# Patient Record
Sex: FEMALE | Race: WHITE | HISPANIC OR LATINO | Employment: FULL TIME | URBAN - METROPOLITAN AREA
[De-identification: names, ages, dates, MRNs, and addresses within clinical notes are randomized per-mention and may not be internally consistent; named-entity substitution may affect disease eponyms.]

---

## 2019-09-26 ENCOUNTER — HOSPITAL ENCOUNTER (EMERGENCY)
Facility: HOSPITAL | Age: 49
Discharge: HOME | End: 2019-09-26
Attending: EMERGENCY MEDICINE | Admitting: EMERGENCY MEDICINE
Payer: COMMERCIAL

## 2019-09-26 ENCOUNTER — APPOINTMENT (EMERGENCY)
Dept: RADIOLOGY | Facility: HOSPITAL | Age: 49
End: 2019-09-26
Payer: COMMERCIAL

## 2019-09-26 VITALS
WEIGHT: 201.13 LBS | BODY MASS INDEX: 33.51 KG/M2 | TEMPERATURE: 98.7 F | DIASTOLIC BLOOD PRESSURE: 60 MMHG | HEART RATE: 88 BPM | SYSTOLIC BLOOD PRESSURE: 100 MMHG | OXYGEN SATURATION: 100 % | RESPIRATION RATE: 16 BRPM | HEIGHT: 65 IN

## 2019-09-26 DIAGNOSIS — J18.9 RLL PNEUMONIA: Primary | ICD-10-CM

## 2019-09-26 DIAGNOSIS — R11.2 NAUSEA AND VOMITING: ICD-10-CM

## 2019-09-26 LAB
ALBUMIN SERPL BCP-MCNC: 3.4 G/DL (ref 3.5–5)
ALP SERPL-CCNC: 55 U/L (ref 46–116)
ALT SERPL W P-5'-P-CCNC: 35 U/L (ref 12–78)
ANION GAP SERPL CALCULATED.3IONS-SCNC: 9 MMOL/L (ref 4–13)
AST SERPL W P-5'-P-CCNC: 37 U/L (ref 5–45)
BACTERIA UR QL AUTO: ABNORMAL /HPF
BASOPHILS # BLD AUTO: 0.01 THOUSANDS/ΜL (ref 0–0.1)
BASOPHILS NFR BLD AUTO: 0 % (ref 0–1)
BILIRUB SERPL-MCNC: 0.4 MG/DL (ref 0.2–1)
BILIRUB UR QL STRIP: ABNORMAL
BUN SERPL-MCNC: 15 MG/DL (ref 5–25)
CALCIUM SERPL-MCNC: 9 MG/DL (ref 8.3–10.1)
CHLORIDE SERPL-SCNC: 104 MMOL/L (ref 100–108)
CLARITY UR: ABNORMAL
CO2 SERPL-SCNC: 27 MMOL/L (ref 21–32)
COLOR UR: ABNORMAL
CREAT SERPL-MCNC: 1.04 MG/DL (ref 0.6–1.3)
EOSINOPHIL # BLD AUTO: 0.02 THOUSAND/ΜL (ref 0–0.61)
EOSINOPHIL NFR BLD AUTO: 0 % (ref 0–6)
ERYTHROCYTE [DISTWIDTH] IN BLOOD BY AUTOMATED COUNT: 13.1 % (ref 11.6–15.1)
EXT PREG TEST URINE: NEGATIVE
EXT. CONTROL ED NAV: NORMAL
GFR SERPL CREATININE-BSD FRML MDRD: 62 ML/MIN/1.73SQ M
GLUCOSE SERPL-MCNC: 103 MG/DL (ref 65–140)
GLUCOSE UR STRIP-MCNC: NEGATIVE MG/DL
HCT VFR BLD AUTO: 40.8 % (ref 34.8–46.1)
HGB BLD-MCNC: 13.6 G/DL (ref 11.5–15.4)
HGB UR QL STRIP.AUTO: NEGATIVE
HYALINE CASTS #/AREA URNS LPF: ABNORMAL /LPF
IMM GRANULOCYTES # BLD AUTO: 0.14 THOUSAND/UL (ref 0–0.2)
IMM GRANULOCYTES NFR BLD AUTO: 1 % (ref 0–2)
KETONES UR STRIP-MCNC: ABNORMAL MG/DL
LEUKOCYTE ESTERASE UR QL STRIP: ABNORMAL
LIPASE SERPL-CCNC: 117 U/L (ref 73–393)
LYMPHOCYTES # BLD AUTO: 0.21 THOUSANDS/ΜL (ref 0.6–4.47)
LYMPHOCYTES NFR BLD AUTO: 2 % (ref 14–44)
MCH RBC QN AUTO: 30.8 PG (ref 26.8–34.3)
MCHC RBC AUTO-ENTMCNC: 33.3 G/DL (ref 31.4–37.4)
MCV RBC AUTO: 92 FL (ref 82–98)
MONOCYTES # BLD AUTO: 0.23 THOUSAND/ΜL (ref 0.17–1.22)
MONOCYTES NFR BLD AUTO: 2 % (ref 4–12)
NEUTROPHILS # BLD AUTO: 13.76 THOUSANDS/ΜL (ref 1.85–7.62)
NEUTS SEG NFR BLD AUTO: 95 % (ref 43–75)
NITRITE UR QL STRIP: NEGATIVE
NON-SQ EPI CELLS URNS QL MICRO: ABNORMAL /HPF
NRBC BLD AUTO-RTO: 0 /100 WBCS
PH UR STRIP.AUTO: 5.5 [PH]
PLATELET # BLD AUTO: 275 THOUSANDS/UL (ref 149–390)
PMV BLD AUTO: 9.1 FL (ref 8.9–12.7)
POTASSIUM SERPL-SCNC: 3.6 MMOL/L (ref 3.5–5.3)
PROT SERPL-MCNC: 6.9 G/DL (ref 6.4–8.2)
PROT UR STRIP-MCNC: ABNORMAL MG/DL
RBC # BLD AUTO: 4.42 MILLION/UL (ref 3.81–5.12)
RBC #/AREA URNS AUTO: ABNORMAL /HPF
SODIUM SERPL-SCNC: 140 MMOL/L (ref 136–145)
SP GR UR STRIP.AUTO: >=1.03 (ref 1–1.03)
UROBILINOGEN UR QL STRIP.AUTO: 1 E.U./DL
WBC # BLD AUTO: 14.37 THOUSAND/UL (ref 4.31–10.16)
WBC #/AREA URNS AUTO: ABNORMAL /HPF

## 2019-09-26 PROCEDURE — 96376 TX/PRO/DX INJ SAME DRUG ADON: CPT

## 2019-09-26 PROCEDURE — 99284 EMERGENCY DEPT VISIT MOD MDM: CPT

## 2019-09-26 PROCEDURE — 85025 COMPLETE CBC W/AUTO DIFF WBC: CPT | Performed by: EMERGENCY MEDICINE

## 2019-09-26 PROCEDURE — 81025 URINE PREGNANCY TEST: CPT | Performed by: EMERGENCY MEDICINE

## 2019-09-26 PROCEDURE — 96361 HYDRATE IV INFUSION ADD-ON: CPT

## 2019-09-26 PROCEDURE — 36415 COLL VENOUS BLD VENIPUNCTURE: CPT | Performed by: EMERGENCY MEDICINE

## 2019-09-26 PROCEDURE — 96375 TX/PRO/DX INJ NEW DRUG ADDON: CPT

## 2019-09-26 PROCEDURE — 71046 X-RAY EXAM CHEST 2 VIEWS: CPT

## 2019-09-26 PROCEDURE — 81001 URINALYSIS AUTO W/SCOPE: CPT | Performed by: EMERGENCY MEDICINE

## 2019-09-26 PROCEDURE — 87086 URINE CULTURE/COLONY COUNT: CPT | Performed by: EMERGENCY MEDICINE

## 2019-09-26 PROCEDURE — 99284 EMERGENCY DEPT VISIT MOD MDM: CPT | Performed by: EMERGENCY MEDICINE

## 2019-09-26 PROCEDURE — 80053 COMPREHEN METABOLIC PANEL: CPT | Performed by: EMERGENCY MEDICINE

## 2019-09-26 PROCEDURE — 83690 ASSAY OF LIPASE: CPT | Performed by: EMERGENCY MEDICINE

## 2019-09-26 PROCEDURE — 96365 THER/PROPH/DIAG IV INF INIT: CPT

## 2019-09-26 RX ORDER — AZITHROMYCIN 250 MG/1
TABLET, FILM COATED ORAL
Qty: 6 TABLET | Refills: 0 | Status: SHIPPED | OUTPATIENT
Start: 2019-09-26 | End: 2019-09-30

## 2019-09-26 RX ORDER — KETOROLAC TROMETHAMINE 30 MG/ML
15 INJECTION, SOLUTION INTRAMUSCULAR; INTRAVENOUS ONCE
Status: COMPLETED | OUTPATIENT
Start: 2019-09-26 | End: 2019-09-26

## 2019-09-26 RX ORDER — METOCLOPRAMIDE HYDROCHLORIDE 5 MG/ML
10 INJECTION INTRAMUSCULAR; INTRAVENOUS ONCE
Status: COMPLETED | OUTPATIENT
Start: 2019-09-26 | End: 2019-09-26

## 2019-09-26 RX ORDER — ONDANSETRON 2 MG/ML
4 INJECTION INTRAMUSCULAR; INTRAVENOUS ONCE
Status: COMPLETED | OUTPATIENT
Start: 2019-09-26 | End: 2019-09-26

## 2019-09-26 RX ORDER — PROMETHAZINE HYDROCHLORIDE 25 MG/ML
12.5 INJECTION, SOLUTION INTRAMUSCULAR; INTRAVENOUS ONCE
Status: COMPLETED | OUTPATIENT
Start: 2019-09-26 | End: 2019-09-26

## 2019-09-26 RX ORDER — ONDANSETRON 4 MG/1
4 TABLET, ORALLY DISINTEGRATING ORAL EVERY 6 HOURS PRN
Qty: 6 TABLET | Refills: 0 | Status: SHIPPED | OUTPATIENT
Start: 2019-09-26

## 2019-09-26 RX ADMIN — AZITHROMYCIN MONOHYDRATE 500 MG: 500 INJECTION, POWDER, LYOPHILIZED, FOR SOLUTION INTRAVENOUS at 06:56

## 2019-09-26 RX ADMIN — METOCLOPRAMIDE 10 MG: 5 INJECTION, SOLUTION INTRAMUSCULAR; INTRAVENOUS at 06:09

## 2019-09-26 RX ADMIN — SODIUM CHLORIDE 1000 ML: 0.9 INJECTION, SOLUTION INTRAVENOUS at 05:05

## 2019-09-26 RX ADMIN — KETOROLAC TROMETHAMINE 15 MG: 30 INJECTION, SOLUTION INTRAMUSCULAR at 05:05

## 2019-09-26 RX ADMIN — ONDANSETRON 4 MG: 2 INJECTION INTRAMUSCULAR; INTRAVENOUS at 05:05

## 2019-09-26 RX ADMIN — ONDANSETRON 4 MG: 2 INJECTION INTRAMUSCULAR; INTRAVENOUS at 06:56

## 2019-09-26 RX ADMIN — PROMETHAZINE HYDROCHLORIDE 12.5 MG: 25 INJECTION INTRAMUSCULAR; INTRAVENOUS at 08:34

## 2019-09-26 NOTE — ED PROVIDER NOTES
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  She denies any fevers/chills, abd pain, chest pain, shortness of breath, urinary symptoms, or blood in her stool  She states that she has had a nonproductive cough since yesterday but denies any other URI symptoms  She states that she has not take anything for the symptoms  No other complaints  History provided by:  Patient  Nausea   The primary symptoms include nausea, vomiting and diarrhea  Primary symptoms do not include fever, abdominal pain, dysuria or rash  The illness began today  The onset was sudden  The illness does not include chills, constipation or back pain  Prior to Admission Medications   Prescriptions Last Dose Informant Patient Reported? Taking? B Complex Vitamins (B COMPLEX 1 PO)   Yes No   Sig: Take 1 tablet by mouth daily   Multiple Vitamins-Minerals (MULTIVITAMIN ADULT PO)   Yes No   Sig: Take 1 tablet by mouth daily   levonorgestrel (MIRENA, 52 MG,) 20 MCG/24HR IUD   Yes Yes   Sig: by Intrauterine route      Facility-Administered Medications: None       History reviewed  No pertinent past medical history  Past Surgical History:   Procedure Laterality Date    CHOLECYSTECTOMY         History reviewed  No pertinent family history  I have reviewed and agree with the history as documented      Social History     Tobacco Use    Smoking status: Never Smoker    Smokeless tobacco: Never Used Substance Use Topics    Alcohol use: Not Currently    Drug use: Never        Review of Systems   Constitutional: Negative for chills and fever  HENT: Negative for congestion, ear pain and sore throat  Eyes: Negative for pain and visual disturbance  Respiratory: Negative for cough, shortness of breath and wheezing  Cardiovascular: Negative for chest pain and leg swelling  Gastrointestinal: Positive for diarrhea, nausea and vomiting  Negative for abdominal pain and constipation  Genitourinary: Negative for dysuria, frequency, hematuria and urgency  Musculoskeletal: Negative for back pain, neck pain and neck stiffness  Skin: Negative for rash and wound  Neurological: Negative for weakness, numbness and headaches  Psychiatric/Behavioral: Negative for agitation and confusion  All other systems reviewed and are negative  Physical Exam  Physical Exam   Constitutional: She is oriented to person, place, and time  She appears well-developed and well-nourished  HENT:   Head: Normocephalic and atraumatic  Mouth/Throat: Oropharynx is clear and moist    Eyes: Pupils are equal, round, and reactive to light  EOM are normal    Neck: Normal range of motion  Neck supple  Cardiovascular: Normal rate and regular rhythm  Pulmonary/Chest: Effort normal and breath sounds normal  No stridor  No respiratory distress  She has no wheezes  She has no rales  She exhibits no tenderness  Abdominal: Soft  Bowel sounds are normal  She exhibits no distension  There is no tenderness  Musculoskeletal: Normal range of motion  Neurological: She is alert and oriented to person, place, and time  No focal deficits   Skin: Skin is warm and dry  Nursing note and vitals reviewed        Vital Signs  ED Triage Vitals   Temperature Pulse Respirations Blood Pressure SpO2   09/26/19 0421 09/26/19 0421 09/26/19 0421 09/26/19 0421 09/26/19 0421   98 7 °F (37 1 °C) 103 19 98/53 100 %      Temp Source Heart Rate Source Patient Position - Orthostatic VS BP Location FiO2 (%)   09/26/19 0421 09/26/19 0421 09/26/19 0421 09/26/19 0421 --   Oral Monitor Lying Right arm       Pain Score       09/26/19 0603       3           Vitals:    09/26/19 0421 09/26/19 0530 09/26/19 1036   BP: 98/53 (!) 99/46 100/60   Pulse: 103 97 88   Patient Position - Orthostatic VS: Lying Lying Sitting         Visual Acuity      ED Medications  Medications   sodium chloride 0 9 % bolus 1,000 mL (0 mL Intravenous Stopped 9/26/19 0907)   ketorolac (TORADOL) injection 15 mg (15 mg Intravenous Given 9/26/19 0505)   ondansetron (ZOFRAN) injection 4 mg (4 mg Intravenous Given 9/26/19 0505)   metoclopramide (REGLAN) injection 10 mg (10 mg Intravenous Given 9/26/19 0609)   ondansetron (ZOFRAN) injection 4 mg (4 mg Intravenous Given 9/26/19 0656)   azithromycin (ZITHROMAX) 500 mg in sodium chloride 0 9% 250mL IVPB 500 mg (0 mg Intravenous Stopped 9/26/19 0807)   promethazine (PHENERGAN) injection 12 5 mg (12 5 mg Intravenous Given 9/26/19 0834)       Diagnostic Studies  Results Reviewed     Procedure Component Value Units Date/Time    Urine Microscopic [[id number]]  (Abnormal) Collected:  09/26/19 0625    Lab Status:  Final result Specimen:  Urine, Clean Catch Updated:  09/26/19 0648     RBC, UA None Seen /hpf      WBC, UA 10-20 /hpf      Epithelial Cells Moderate /hpf      Bacteria, UA Occasional /hpf      Hyaline Casts, UA 10-20 /lpf     Urine culture [[id number]] Collected:  09/26/19 0625    Lab Status:   In process Specimen:  Urine, Clean Catch Updated:  09/26/19 0648    UA w Reflex to Microscopic w Reflex to Culture [[id number]]  (Abnormal) Collected:  09/26/19 0625    Lab Status:  Final result Specimen:  Urine, Clean Catch Updated:  09/26/19 7345     Color, UA Ngozi     Clarity, UA Slightly Cloudy     Specific Gravity, UA >=1 030     pH, UA 5 5     Leukocytes, UA Trace     Nitrite, UA Negative     Protein, UA 30 (1+) mg/dl      Glucose, UA Negative mg/dl      Ketones, UA 15 (1+) mg/dl      Urobilinogen, UA 1 0 E U /dl      Bilirubin, UA Small     Blood, UA Negative    POCT pregnancy, urine [[id number]]  (Normal) Resulted:  09/26/19 0628    Lab Status:  Final result Updated:  09/26/19 0628     EXT PREG TEST UR (Ref: Negative) Negative     Control Valid    CBC and differential [[id number]]  (Abnormal) Collected:  09/26/19 0505    Lab Status:  Final result Specimen:  Blood from Arm, Left Updated:  09/26/19 0554     WBC 14 37 Thousand/uL      RBC 4 42 Million/uL      Hemoglobin 13 6 g/dL      Hematocrit 40 8 %      MCV 92 fL      MCH 30 8 pg      MCHC 33 3 g/dL      RDW 13 1 %      MPV 9 1 fL      Platelets 139 Thousands/uL      nRBC 0 /100 WBCs      Neutrophils Relative 95 %      Immat GRANS % 1 %      Lymphocytes Relative 2 %      Monocytes Relative 2 %      Eosinophils Relative 0 %      Basophils Relative 0 %      Neutrophils Absolute 13 76 Thousands/µL      Immature Grans Absolute 0 14 Thousand/uL      Lymphocytes Absolute 0 21 Thousands/µL      Monocytes Absolute 0 23 Thousand/µL      Eosinophils Absolute 0 02 Thousand/µL      Basophils Absolute 0 01 Thousands/µL     CMP [[id number]]  (Abnormal) Collected:  09/26/19 0505    Lab Status:  Final result Specimen:  Blood from Arm, Left Updated:  09/26/19 0543     Sodium 140 mmol/L      Potassium 3 6 mmol/L      Chloride 104 mmol/L      CO2 27 mmol/L      ANION GAP 9 mmol/L      BUN 15 mg/dL      Creatinine 1 04 mg/dL      Glucose 103 mg/dL      Calcium 9 0 mg/dL      AST 37 U/L      ALT 35 U/L      Alkaline Phosphatase 55 U/L      Total Protein 6 9 g/dL      Albumin 3 4 g/dL      Total Bilirubin 0 40 mg/dL      eGFR 62 ml/min/1 73sq m     Narrative:       [place] guidelines for Chronic Kidney Disease (CKD):     Stage 1 with normal or high GFR (GFR > 90 mL/min/1 73 square meters)    Stage 2 Mild CKD (GFR = 60-89 mL/min/1 73 square meters)    Stage 3A Moderate CKD (GFR = 45-59 mL/min/1 73 square meters)   Stage 3B Moderate CKD (GFR = 30-44 mL/min/1 73 square meters)    Stage 4 Severe CKD (GFR = 15-29 mL/min/1 73 square meters)    Stage 5 End Stage CKD (GFR <15 mL/min/1 73 square meters)  Note: GFR calculation is accurate only with a steady state creatinine    Lipase [[id number]]  (Normal) Collected:  09/26/19 0505    Lab Status:  Final result Specimen:  Blood from Arm, Left Updated:  09/26/19 0543     Lipase 117 u/L                  XR chest 2 views   ED Interpretation by Artur Archer DO (09/26 4262)   Right lower lobe infiltrate       Final Result by Marcin Cm MD (09/26 2291)      No acute cardiopulmonary disease  Workstation performed: [id number]                    Procedures  Procedures       ED Course  ED Course as of Sep 26 2323   Thu Sep 26, 2019   5537 Nausea and vomiting x multiple x 3 hours  Body aches  Diarrhea x 1  No measured fevers  No known sick contacts  Cough since yesterday  Took dayquil and motrin without relief  MDM  Number of Diagnoses or Management Options  Nausea and vomiting: new and requires workup  RLL pneumonia [facility]): new and requires workup  Diagnosis management comments: Patient with nausea/ vomiting/ and diarrhea  Will get labs, UA, and urine preg- will give fluids and zofran for symptom relief       Case signed out to Dr Britney Teague, pending symptom relief and dispo        Amount and/or Complexity of Data Reviewed  Clinical lab tests: ordered and reviewed  Tests in the radiology section of CPT®: ordered and reviewed  Tests in the medicine section of CPT®: ordered and reviewed  Discussion of test results with the performing providers: yes  Decide to obtain previous medical records or to obtain history from someone other than the patient: yes  Obtain history from someone other than the patient: yes  Review and summarize past medical records: yes  Discuss the patient with other providers: yes  Independent visualization of images, tracings, or specimens: yes    Patient Progress  Patient progress: improved      Disposition  Final diagnoses:   RLL pneumonia ([facility] [address] )   Nausea and vomiting     Time reflects when diagnosis was documented in both MDM as applicable and the Disposition within this note     Time User Action Codes Description Comment    9/26/2019  6:45 AM Spring Ruvalcaba Add [J18 1] RLL pneumonia ([facility] [address] )     9/26/2019  6:45 AM Spring Ruvalcaba Add [R11 2] Nausea and vomiting       ED Disposition     ED Disposition Condition Date/Time Comment    Discharge Stable Thu Sep 26, 2019  6:45 AM Slava Aguilar discharge to home/self care  Follow-up Information     Follow up With Specialties Details Why Contact Info Additional Information    Joy Munson MD Family Medicine Call in 1 day For follow-up within 2-3 days  [facility] Emergency Medicine Go to  Immediately for any new or worsening symptoms  [address] [place] [id number]  [address] [place] [address], [address] [place] [address], [facility]          There are no discharge medications for this patient  No discharge procedures on file      ED Provider  Electronically Signed by           Bhumika Smart DO  09/26/19 0596

## 2019-09-27 LAB — BACTERIA UR CULT: NORMAL
